# Patient Record
Sex: FEMALE | Race: WHITE
[De-identification: names, ages, dates, MRNs, and addresses within clinical notes are randomized per-mention and may not be internally consistent; named-entity substitution may affect disease eponyms.]

---

## 2018-02-06 ENCOUNTER — HOSPITAL ENCOUNTER (OUTPATIENT)
Dept: HOSPITAL 75 - RAD | Age: 58
End: 2018-02-06
Attending: OBSTETRICS & GYNECOLOGY
Payer: COMMERCIAL

## 2018-02-06 DIAGNOSIS — Z12.31: Primary | ICD-10-CM

## 2018-02-06 PROCEDURE — 77067 SCR MAMMO BI INCL CAD: CPT

## 2018-02-07 NOTE — DIAGNOSTIC IMAGING REPORT
INDICATION: Digital mammogram bilateral screening.



This study was compared to prior exams of 12/22/2016, 10/23/2015

and 7/2/14. At this time, there are no current complaints.



The current study was also evaluated with a Computer Aided

Detection (CAD) system.



FINDINGS: The fibroglandular tissue in both breasts is

heterogeneously dense. This does limit the sensitivity of this

exam. Overall, there does not appear to have been any significant

change when compared to the prior study. No primary or secondary

sign of malignancy is noted.



IMPRESSION: There is no radiographic evidence for malignancy.



ACR BI-RADS Category 1: Negative.

Result letter will be mailed to the patient.

Note:  At least 10% of breast cancer is not imaged by

mammography.



Dictated by: 



  Dictated on workstation # [id number]

## 2018-02-14 ENCOUNTER — HOSPITAL ENCOUNTER (OUTPATIENT)
Dept: HOSPITAL 75 - PREOP | Age: 58
Discharge: HOME | End: 2018-02-14
Attending: PODIATRIST
Payer: COMMERCIAL

## 2018-02-14 VITALS — DIASTOLIC BLOOD PRESSURE: 89 MMHG | SYSTOLIC BLOOD PRESSURE: 141 MMHG

## 2018-02-14 VITALS — HEIGHT: 62 IN | BODY MASS INDEX: 33.31 KG/M2 | WEIGHT: 181 LBS

## 2018-02-14 DIAGNOSIS — M20.11: ICD-10-CM

## 2018-02-14 DIAGNOSIS — Z01.818: Primary | ICD-10-CM

## 2018-02-14 DIAGNOSIS — M20.41: ICD-10-CM

## 2018-02-14 DIAGNOSIS — Z11.2: ICD-10-CM

## 2018-02-14 DIAGNOSIS — M89.371: ICD-10-CM

## 2018-02-14 PROCEDURE — 87081 CULTURE SCREEN ONLY: CPT

## 2018-02-26 ENCOUNTER — HOSPITAL ENCOUNTER (OUTPATIENT)
Dept: HOSPITAL 75 - SDC | Age: 58
Discharge: HOME | End: 2018-02-26
Attending: PODIATRIST
Payer: COMMERCIAL

## 2018-02-26 VITALS — WEIGHT: 181 LBS | BODY MASS INDEX: 33.31 KG/M2 | HEIGHT: 62 IN

## 2018-02-26 VITALS — SYSTOLIC BLOOD PRESSURE: 129 MMHG | DIASTOLIC BLOOD PRESSURE: 63 MMHG

## 2018-02-26 VITALS — DIASTOLIC BLOOD PRESSURE: 84 MMHG | SYSTOLIC BLOOD PRESSURE: 154 MMHG

## 2018-02-26 VITALS — SYSTOLIC BLOOD PRESSURE: 148 MMHG | DIASTOLIC BLOOD PRESSURE: 80 MMHG

## 2018-02-26 DIAGNOSIS — M20.11: Primary | ICD-10-CM

## 2018-02-26 DIAGNOSIS — M89.371: ICD-10-CM

## 2018-02-26 DIAGNOSIS — E66.9: ICD-10-CM

## 2018-02-26 DIAGNOSIS — M20.41: ICD-10-CM

## 2018-02-26 PROCEDURE — 73620 X-RAY EXAM OF FOOT: CPT

## 2018-02-26 NOTE — DIAGNOSTIC IMAGING REPORT
INDICATION: 

Postop right foot.



COMPARISON: 

None.



FINDINGS: 

Two radiographic views of the right foot were obtained.

Postsurgical changes are identified. An osteotomy line with a

cerclage wire is noted involving the proximal margins of the

first proximal phalanx. A Eva wire is also seen traversing

the second toe. A partially threaded screw is seen traversing the

distal end of the second metatarsal. Sideplates, anchor screws,

and a partially threaded screw are also identified involving the

first tarsal/metatarsal joint space. No unexpected radiopaque

foreign bodies are seen. There is no acute fracture or

dislocation. Mild generalized soft tissue swelling is noted.



IMPRESSION:

Postsurgical changes of the right foot as described above. No

unexpected radiopaque foreign bodies are identified.



Dictated by: 



  Dictated on workstation # [id number]

## 2018-02-26 NOTE — ANESTHESIA-GENERAL POST-OP
General


Patient Condition


Mental Status/LOC:  Same as Preop


Cardiovascular:  Satisfactory


Nausea/Vomiting:  Absent


Respiratory:  Satisfactory


Pain:  Controlled


Complications:  Absent





Post Op Complications


Complications


None





Follow Up Care/Instructions


Patient Instructions


None needed.





Anesthesia/Patient Condition


Patient Condition


Patient was doing well prior to discharge, no complaints, stable vital signs, 

no apparent adverse anesthesia problems.











THOMAS MARTINEZ DO Feb 26, 2018 14:03

## 2018-02-26 NOTE — DIAGNOSTIC IMAGING REPORT
INDICATION: 

Right foot surgery. Patient underwent second metatarsal

osteotomy.



TECHNIQUE/FINDINGS: 

Fluoroscopy was provided in the OR for right foot surgery. A

total of 7 seconds of fluoroscopic time was utilized. Images

demonstrate a plate and numerous screws transfixing the first

carpal/metacarpal joint. There is a fully threaded screw

transfixing the distal second metatarsal. There has been an

osteotomy of the proximal phalanx of the great toe with a small

cerclage wire present. The overall alignment is anatomic.



IMPRESSION: 

Fluoroscopy for right foot surgery.



Dictated by: 



  Dictated on workstation # [id number]

## 2018-02-26 NOTE — PHYSICAL THERAPY ORTHO EVAL
PT Orthopedic Evaluation


Type of Surgery





right hallus valgus, hypertrophic 2nd metatarsal, hammertoe 2nd





Prior Level of Function


Current Living Status:  Spouse


Locomotion     (Upon Admit):  Independent


Established Durable Medical Eq:  Crutches


and knee scooter; has access to walker





Subjective


Subjective


Patient reports she does not feel comfortable with crutches.  This PT agrees 

and recommends a FWW or standard walker for home use for transfers.


Entry Into Home:  Stairs With Railing


Other Obstacles:  spouse reports he is able to use w/c to back her into the 

house





Motor Control


Motor Control:  Motor Control WNL





ROM


ROM:  WFL, except focal deficit





Strength


Strength:  WFL





Transfer


Transfers (B, C, W/C) (FIM):  5


with use of FWW with SPT





Gait


Gait Assistive Device:  FWW


Right Lower Extremity:  Right


Weight Bearing Status RLE:  Non Weight Bearing


Left Lower Extremity:  Left


Weight Bearing Status LLE:  Full Weight Bearing


Gait (FIM):  1


Distance (FIM):  1=up to 49 ft


Distance:  3 "hops" forward and backward


Gait Level of Assist:  5





Treatment Rendered


Treatment:  Gait Train (use knee scooter or w/c for majority of mobility at 

home and in community)





Assessment/Goals


Goal Time Frame:  1 Visit





Plan


Treatment Plan:  Discharge, Education, Safety, Transfers


Treatment Duration:  eval


Visits Per Week:  1


PT/Family Agrees to Plan:  Yes





Time


Time In:  1230


Time Out:  1250


Total Billed Treatment Time:  20


Billed Treatment Time


1 visit


EVLowC 20 min


No











FRANKY KRAFT PT Feb 26, 2018 13:07

## 2018-02-26 NOTE — PROGRESS NOTE-POST OPERATIVE
Post-Operative Progess Note


Surgeon (s)/Assistant (s)


Surgeon


JAYLIN MADDOX DPM


Assistant:  none





Pre-Operative Diagnosis


Hallux Valgus, Hypertrophic 2nd Metatarsal, Hammertoe 2nd, all right foot





Post-Operative Diagnosis





Same





Procedure & Operative Findings


Date of Procedure


2/26/18


Procedure Performed/Findings


Modified Lapidus-Akin Bunionectomy, 2nd Metatarsal Osteotomy, 2nd digit 

Hammertoe reduction, all right


Anesthesia Type


General





Estimated Blood Loss


Estimated blood loss (mL):  Minimal





Specimens/Packing


Specimens Removed


None











JAYLIN MADDOX DPM Feb 26, 2018 10:49 am

## 2018-02-26 NOTE — XMS REPORT
Continuity of Care Document

 Created on: 2018



MAREK OBRIEN

External Reference #: [id number]

: 1960

Sex: Female



Demographics







 Address  [address] [place] [address] [address] [place] [address]  [id number]

 

 Home Phone  [phone] x

 

 Preferred Language  Unknown

 

 Marital Status  Unknown

 

 Taoism Affiliation  Unknown

 

 Race  Unknown

 

 Ethnic Group  Unknown





Author







 Author  [facility]

 

 Organization  [facility]

 

 Address  Unknown

 

 Phone  Unavailable



              



Allergies

      





 Active            Description            Code            Type            
Severity            Reaction            Onset            Reported/Identified   
         Relationship to Patient            Clinical Status        

 

 Yes            Penicillins            [id number]            Drug Allergy      
      Unknown            childhood aller                         2018    
                              



                  



Medications

      



There is no data.                  



Problems

      





 Date Dx Coded            Attending            Type            Code            
Diagnosis            Diagnosed By        

 

 10/28/2015            KATELYN MARTIN DO C            Ot            Z12.31       
                           

 

 2015            MARTIN DO KATELYN C            Ot            Z12.31       
                           

 

 2015            MARTIN DO, KATELYN C            Ot            Z12.31       
                           

 

 2015            MARTIN DO KATELYN C            Ot            Z12.31       
                           

 

 2015            MATRIN DO KATELYN C            Ot            R92.8        
                          

 

 11/10/2015            MARTIN DO, KATELYN C            Ot            Z12.31       
                           

 

 11/10/2015            MARTIN DO, KATELYN C            Ot            R92.8        
                          

 

 2015            MARTIN DO, KATELYN C            Ot            R92.8        
                          

 

 2015            MARTIN DO, KATELYN C            Ot            R92.8        
                          

 

 2015            MARTIN DO, KATELYN C            Ot            R92.8        
                          

 

 2015            MARTIN DO, KATELYN C            Ot            R92.8        
                          

 

 2016            MARTIN DO KATELYN C            Ot            Z12.31       
     ENCNTR SCREEN MAMMOGRAM FOR MALIGNANT NE                     

 

 2016            MARTIN DO KATELYN C            Ot            R92.8        
    OTH ABN AND INCONCLUSIVE FINDINGS ON DX                      

 

 2016            MARTIN GONSALO BYRDA C            Ot            Z12.31       
     ENCNTR SCREEN MAMMOGRAM FOR MALIGNANT NE                     

 

 2016            GONSALO MARTIN DOA C            Ot            Z12.31       
     ENCNTR SCREEN MAMMOGRAM FOR MALIGNANT NE                     

 

 2017            MARIO BYRD KATELYN C            Ot            Z12.31       
     ENCNTR SCREEN MAMMOGRAM FOR MALIGNANT NE                     

 

 2018            MARTIN DO KATELYN C            Ot            Z12.31       
     ENCNTR SCREEN MAMMOGRAM FOR MALIGNANT NE                     

 

 2018            MARTIN DO, KATELYN C            Ot            Z12.31       
     ENCNTR SCREEN MAMMOGRAM FOR MALIGNANT NE                     

 

 2018            KATELYN MARTIN DO            Ot            R92.8        
    OTH ABN AND INCONCLUSIVE FINDINGS ON DX                      

 

 2018            KATELYN MARTIN DO            Ot            Z12.31       
     ENCNTR SCREEN MAMMOGRAM FOR MALIGNANT NE                     

 

 2018            KATELYN MARTIN DO            Ot            Z12.31       
     ENCNTR SCREEN MAMMOGRAM FOR MALIGNANT NE                     

 

 02/15/2018            VARSHA DPM, JAYLIN Q            Ot            M20.11      
      HALLUX VALGUS (ACQUIRED), RIGHT FOOT                     

 

 02/15/2018            VARSHA DPM, JAYLIN Q            Ot            M20.41      
      OTHER HAMMER TOE(S) (ACQUIRED), RIGHT FO                     

 

 02/15/2018            VARSHA DPM, JAYLIN Q            Ot            M89.371     
       HYPERTROPHY OF BONE, RIGHT ANKLE AND DARVIN                     

 

 02/15/2018            VARSHA DPM, JAYLIN Q            Ot            Z01.818     
       ENCOUNTER FOR OTHER PREPROCEDURAL EXAMIN                     

 

 02/15/2018            VARSHA DPM, JAYLIN Q            Ot            Z11.2       
     ENCOUNTER FOR SCREENING FOR OTHER BACTER                     



                                                                    



Procedures

      



There is no data.                  



Results

      





 Test            Result            Range        









 Methicillin resistant Staphylococcus aureus (MRSA) screening culture -  13:13         









 Methicillin resistant Staphylococcus aureus (MRSA) screening culture          
  NEG             NRG        



                



Encounters

      





 ACCT No.            Visit Date/Time            Discharge            Status    
        Pt. Type            Provider            Facility            Loc./Unit  
          Complaint        

 

 [id number]            2018 12:55:00            2018 13:15:00    
        DIS            Outpatient            VARSHA DPDIONI JAYLIN Q            [facility]            PREOP            RIGHT HALLUX RIGIDUS  
      

 

 [id number]            2018 08:09:00            2018 23:59:59    
        CLS            Outpatient            KATELYN MARTIN DO            [facility]            RAD            SCREENING        

 

 [id number]            2016 09:35:00            2016 23:59:59    
        CLS            Outpatient            KATELYN MARTIN DO            [facility]            RAD            BREAST CA SCREENING     
   

 

 [id number]            2015 08:30:00            2015 23:59:59    
        CLS            Outpatient            KATELYN MARTIN DO            [facility]            RAD            ABNORMAL MAMMO        

 

 [id number]            10/23/2015 14:42:00            10/23/2015 23:59:59    
        CLS            Outpatient            KATELYN MARTIN DO            [facility]            RAD            SCREENING        

 

 [id number]            2018 08:00:00                         PEN       
     Preadmit            JAYLIN MADDOX DPM            [facility]            SDC            RIGHT HALLUX RIGIDUS

## 2018-02-26 NOTE — OPERATIVE REPORT
DATE OF SERVICE:  



SURGEON:

Pushpa Maddox DPM.



PREOPERATIVE DIAGNOSES:

1.  Hallux abductovalgus metatarsal primus varus, right foot.

2.  Hypertrophic second metatarsal.

3.  Hammer digit syndrome, right second toe.



POSTOPERATIVE DIAGNOSES:

1.  Hallux abductovalgus metatarsal primus varus, right foot.

2.  Hypertrophic second metatarsal.

3.  Hammer digit syndrome, right second toe.



PROCEDURE:

1.  Modified Lapidus Farshad bunionectomy, right.

2.  Second metatarsal osteotomy, right.

3.  Reduction of hammertoe, right second digit.



WOUND CLASS:

Clean.



ANESTHESIA:

General.



HEMOSTASIS:

Pneumatic thigh tourniquet at 300 mmHg.



INDICATIONS:

This 57-year-old female presents complaining of a painful bunion and hammertoe,

right foot.  Conservative therapy has met with unsatisfactory result and the

patient is agreeable to surgical intervention after risks and complications were

discussed at length.  No guarantees were extended to the patient and she is

willing to proceed.



DESCRIPTION OF PROCEDURE:

The patient was brought back to the operative table, placed in a secure supine

position.  Appropriate timeout was performed.  A general anesthetic was then

induced.  A pneumatic thigh tourniquet was placed on the right lower extremity

over several layers of padding.  The right foot was then prepped and draped in

normal sterile manner.  The right foot was then elevated and allowed to 
exsanguinate

after which the tourniquet was inflated to 300 mmHg.



Attention was then directed to the dorsal aspect of the first metatarsal

cuneiform joint where a 5 cm longitudinal linear incision was created.  The

incision was deepened in the same plane with great care to identify and retract

all vital neurovascular structures.  The incision was deepened down to the

lateral aspect of the extensor hallucis longus tendon.  The incision was

deepened down sharply to the first metatarsal cuneiform joint where

subperiosteal dissection was carried out.  The articular cartilage was resected

to the distal aspect of the medial cuneiform perpendicular to the long axis of

the second metatarsal utilizing power sagittal saw.  The articular cartilage at

the base of the first metatarsal was also resected perpendicular to its long

axis.  The adjacent surgical sites of the base of the 1st metatarsal and distal

first cuneiform were fenestrated with a K-wire.  Next, utilizing a 0.062 smooth

K wire driven from plantar distal to proximal dorsal, the first ray was held in

rectus alignment.  Intraoperative x-ray indicated a good reduction of the first

intermetatarsal angle.  The wound was flushed with copious amounts of normal

saline.  Utilizing the [place] Lapidus plate, there were 2 proximal screws that

were 2.7 in diameter and 15 mm in length after which the interfrag screw was

headless.  It was 3.5 cannulated screw 40 mm in length driven from distal dorsal

to proximal plantar.  The distal screws were then applied to the plate, which

was a 2.7 nonlocking screw of 11 mm of length followed by 2.7 nonlocking screw

14 mm of length.  Intraoperative x-ray indicated appropriate reduction of the

intermetatarsal angle and bony apposition at the arthrodesis site.  The wound

was flushed with copious amounts of normal saline.  Closure was performed in

layers.  Deep closure was performed with 3-0 Vicryl, superficial with 4-0

Vicryl, skin closed with 4-0 Prolene in a horizontal mattress type stitch.



Attention was then directed to the distal aspect of the first metatarsal at the

metatarsophalangeal joint where a 4 cm longitudinal linear incision was created.

 The incision was deepened in the same plane with great care to identify and

retract all vital neurovascular structures.  All the necessary blood vessels

were cauterized as encountered.  The incision was deepened down to the capsular

tissue where a longitudinal capsulotomy was performed exposing the hypertrophic

medial and dorsal eminence to the first metatarsal head, which was resected

utilizing a power sagittal saw.  The wound was flushed with copious amounts of

normal saline.  At this point, there remained some lateral deviation to the

right hallux and an Akin type procedure was then performed.



Attention was then directed to the diaphysis of the right hallux proximal

phalanx where a subperiosteal dissection was carried out.  A wedge bone was then

resected utilizing a power sagittal saw with the base medial and lateral

cortices held intact.  A  hole was created to the dorsal medial aspect of

the osteotomy and a 28-gauge monofilm wire was passed through these  holes

securing the osteotomy in a closed position.  Excellent bony apposition and

fixation was appreciated.  The wound was flushed and closure was performed in

layers.  Deep closure was performed with 3-0 Vicryl, superficial with 4-0

Vicryl, skin closed with 4-0 Prolene in a horizontal mattress type stitch.



Attention was then directed to the right second ray where a 6 cm longitudinal

linear incision was created from the diaphysis of the second metatarsal to the

distal interphalangeal joint.  The incision was deepened in the same plane with

great care to identify and retract all vital neurovascular structures.  The

extensor tendon overlying the proximal phalanx was incised with a Z slide

lengthening type cut.  The extensor tendon was reflected proximally and the

extensor pack released overlying the metatarsophalangeal joint.  A dorsal

capsulorrhaphy was performed to the metatarsophalangeal joint as well as a

release of the medial and lateral collateral ligaments.  There is medial

deviation to the proximal phalanx of the right second toe.  A McGlamry elevator

was used to release any plantar adhesions.  Next, a second metatarsal osteotomy

was then performed with a power sagittal saw.  The orientation of the cut was

distal lateral to proximal medial at the surgical neck allowing the capital

fragment to progress proximally and medially.  The capital fragment was then

fixated in its corrected position utilizing a 2.0 snap-off screw a 16 mm in

length driven from distal medial to proximal lateral across the osteotomy.  The

head was further contoured and smoothed with a rongeur.  The wound was flushed

with copious amounts of normal saline.



Attention was then directed to the proximal phalanx of the right second toe

where an arthrodesis was performed.  The medial and lateral collateral ligaments

were released.  The head of the proximal phalanx was fashioned into a peg

utilizing a power sagittal saw and a power reji and a hole was created to the

base of the middle phalanx with a power reji.  A 0.054 K wire was then driven

down the toe securing the arthrodesis in a closed position.  Excellent alignment

of the digit was appreciated.  The excess K wire out the end of the toe was cut

and a protective ball placed over the wire.  The wound was flushed with copious

amounts of normal saline.  Closure was then performed in layers.  Deep closure

was performed with 3-0 Vicryl.  Some reefing was performed to the lateral aspect

of the right second metatarsophalangeal joint with 3-0 Vicryl.  Superficial

closure was performed with 4-0 Vicryl and skin closure was performed with 4-0

Prolene in a horizontal mattress type stitch.



Postoperative injection included 17 mL of 0.5% Marcaine plain.  It should be

noted that preincision, a 10 mL of 0.5% Marcaine was used for local block to the

first and second rays, right foot.  Postoperative injection also included 10 mg

of dexamethasone to the surgical site.  Postoperative dressing included a

Betadine soaked Adaptic, sterile 4 x 4, sterile Kerlix, all secured with a Coban

wrap.



The patient tolerated the anesthesia and procedure well and was transported from

the operating room to the recovery area.  Vital signs stable and vascular status

intact to all digits of the right foot.  The patient is to remain

nonweightbearing on the right foot.  She was given a prescription for

clindamycin as well as Vicodin.  She is to follow up in my office in 10 days'

period of time or sooner if necessary.





Job ID: [id number]

DocumentID: [id number]

Dictated Date:  02/26/2018 11:04:08

Transcription Date: 02/26/2018 17:38:11

Dictated By: PUSHPA MADDOX DPM

MTDD

## 2018-07-26 ENCOUNTER — HOSPITAL ENCOUNTER (OUTPATIENT)
Dept: HOSPITAL 75 - RAD | Age: 58
End: 2018-07-26
Attending: FAMILY MEDICINE
Payer: COMMERCIAL

## 2018-07-26 DIAGNOSIS — J98.4: Primary | ICD-10-CM

## 2018-07-26 PROCEDURE — 71046 X-RAY EXAM CHEST 2 VIEWS: CPT

## 2018-07-26 NOTE — DIAGNOSTIC IMAGING REPORT
EXAMINATION: PA and lateral chest at 04:00 p.m.



INDICATION: Cough and wheezing.



FINDINGS: There are no prior studies available for comparison.



The heart size is within normal limits. The lungs are generally

clear. There is no sign of failure, pneumonia, or of a pleural

effusion to indicate an acute abnormality. However on the PA

view, there is a roughly 1 cm area of increased density overlying

the right upper lung. There is no corresponding abnormality seen

on the lateral view and this finding may merely be secondary to

superimposition as opposed to a parenchymal abnormality. Even so,

I would recommend that an apical lordotic view be performed for

further study.



The mediastinum is not widened. The osseous structures are

intact.



IMPRESSION:

1. There is no evidence for an acute cardiopulmonary abnormality.

2. The nodular density overlying the right upper lung seen only

on the PA view is more likely due to superimposition than to a

parenchymal abnormality. Recommendations as above.



Dictated by: 



  Dictated on workstation # [id number]

## 2018-07-27 ENCOUNTER — HOSPITAL ENCOUNTER (OUTPATIENT)
Dept: HOSPITAL 75 - RAD | Age: 58
End: 2018-07-27
Attending: NURSE PRACTITIONER
Payer: COMMERCIAL

## 2018-07-27 DIAGNOSIS — R91.8: Primary | ICD-10-CM

## 2018-07-27 PROCEDURE — 71045 X-RAY EXAM CHEST 1 VIEW: CPT

## 2018-07-27 NOTE — DIAGNOSTIC IMAGING REPORT
INDICATION: APICAL/LORDOTIC VIEW, RUL NODULE SEEN ON 2 VIEW CXR

7-26-18



COMPARISON: 07/26/2018



FINDINGS: Single frontal view of the chest demonstrates normal

heart size and pulmonary vascularity. The lungs are well aerated

and clear. Previous described potential nodule in the right upper

lung resolves with apical lordotic positioning. No large pleural

effusion or pneumothorax is seen. The visualized osseous

structures show no acute abnormalities.



IMPRESSION: 

1. Interval resolution of potential pulmonary nodule with apical

lordotic positioning confirms artifact related to right first

rib.

2. No other acute cardiopulmonary process.



Dictated by: 



  Dictated on workstation # [id number]

## 2018-08-29 ENCOUNTER — HOSPITAL ENCOUNTER (OUTPATIENT)
Dept: HOSPITAL 75 - RAD | Age: 58
End: 2018-08-29
Attending: FAMILY MEDICINE
Payer: COMMERCIAL

## 2018-08-29 DIAGNOSIS — R06.2: ICD-10-CM

## 2018-08-29 DIAGNOSIS — R05: Primary | ICD-10-CM

## 2018-08-29 PROCEDURE — 94060 EVALUATION OF WHEEZING: CPT

## 2018-08-29 PROCEDURE — 94726 PLETHYSMOGRAPHY LUNG VOLUMES: CPT

## 2018-08-29 PROCEDURE — 94729 DIFFUSING CAPACITY: CPT

## 2020-07-31 NOTE — PROGRESS NOTE-PRE OPERATIVE
Pre-Operative Progress Note


H&P Reviewed


The H&P was reviewed, patient examined and no changes noted.


Date Seen by Provider:  Feb 26, 2018


Time Seen by Provider:  07:27


Date H&P Reviewed:  Feb 26, 2018


Time H&P Reviewed:  07:27


Pre-Operative Diagnosis:  Hallux Valgus, Hypertrophic 2nd Metatarsal, Hammertoe 

2nd, all right foot











JAYLIN MADDOX DPM Feb 26, 2018 7:28 am
DO NOT take any Tylenol (Acetaminophen) or narcotics containing Tylenol until after  midnight . You received Tylenol during your operation and it can cause damage to your liver if too much is taken within a 24 hour time period.